# Patient Record
Sex: MALE | Race: WHITE
[De-identification: names, ages, dates, MRNs, and addresses within clinical notes are randomized per-mention and may not be internally consistent; named-entity substitution may affect disease eponyms.]

---

## 2018-09-21 ENCOUNTER — HOSPITAL ENCOUNTER (OUTPATIENT)
Dept: HOSPITAL 19 - SDCO | Age: 48
Discharge: HOME | End: 2018-09-21
Attending: SPECIALIST
Payer: COMMERCIAL

## 2018-09-21 VITALS — DIASTOLIC BLOOD PRESSURE: 80 MMHG | HEART RATE: 73 BPM | SYSTOLIC BLOOD PRESSURE: 113 MMHG

## 2018-09-21 VITALS — DIASTOLIC BLOOD PRESSURE: 89 MMHG | TEMPERATURE: 97 F | HEART RATE: 66 BPM | SYSTOLIC BLOOD PRESSURE: 115 MMHG

## 2018-09-21 VITALS — HEART RATE: 61 BPM | DIASTOLIC BLOOD PRESSURE: 77 MMHG | SYSTOLIC BLOOD PRESSURE: 104 MMHG

## 2018-09-21 VITALS — BODY MASS INDEX: 23.89 KG/M2 | WEIGHT: 157.63 LBS | HEIGHT: 68 IN

## 2018-09-21 VITALS — SYSTOLIC BLOOD PRESSURE: 119 MMHG | TEMPERATURE: 97 F | DIASTOLIC BLOOD PRESSURE: 90 MMHG | HEART RATE: 71 BPM

## 2018-09-21 VITALS — SYSTOLIC BLOOD PRESSURE: 104 MMHG | HEART RATE: 63 BPM | DIASTOLIC BLOOD PRESSURE: 71 MMHG

## 2018-09-21 VITALS — HEART RATE: 62 BPM | SYSTOLIC BLOOD PRESSURE: 102 MMHG | DIASTOLIC BLOOD PRESSURE: 74 MMHG

## 2018-09-21 VITALS — SYSTOLIC BLOOD PRESSURE: 105 MMHG | DIASTOLIC BLOOD PRESSURE: 74 MMHG | HEART RATE: 59 BPM

## 2018-09-21 VITALS — SYSTOLIC BLOOD PRESSURE: 108 MMHG | HEART RATE: 60 BPM | DIASTOLIC BLOOD PRESSURE: 73 MMHG

## 2018-09-21 DIAGNOSIS — Z83.71: ICD-10-CM

## 2018-09-21 DIAGNOSIS — Z12.11: Primary | ICD-10-CM

## 2018-09-21 DIAGNOSIS — D12.8: ICD-10-CM

## 2018-09-21 DIAGNOSIS — Z80.0: ICD-10-CM

## 2018-09-21 DIAGNOSIS — K57.30: ICD-10-CM

## 2018-09-21 DIAGNOSIS — D12.0: ICD-10-CM

## 2022-04-01 ENCOUNTER — HOSPITAL ENCOUNTER (OUTPATIENT)
Dept: HOSPITAL 19 - SDCO | Age: 52
Discharge: HOME | End: 2022-04-01
Attending: SPECIALIST
Payer: COMMERCIAL

## 2022-04-01 VITALS — WEIGHT: 160.72 LBS | BODY MASS INDEX: 24.36 KG/M2 | HEIGHT: 67.99 IN

## 2022-04-01 VITALS — DIASTOLIC BLOOD PRESSURE: 85 MMHG | HEART RATE: 69 BPM | SYSTOLIC BLOOD PRESSURE: 112 MMHG | TEMPERATURE: 97.6 F

## 2022-04-01 VITALS — HEART RATE: 58 BPM | SYSTOLIC BLOOD PRESSURE: 112 MMHG | DIASTOLIC BLOOD PRESSURE: 77 MMHG

## 2022-04-01 VITALS — SYSTOLIC BLOOD PRESSURE: 115 MMHG | HEART RATE: 60 BPM | DIASTOLIC BLOOD PRESSURE: 74 MMHG

## 2022-04-01 VITALS — SYSTOLIC BLOOD PRESSURE: 99 MMHG | TEMPERATURE: 97.3 F | DIASTOLIC BLOOD PRESSURE: 73 MMHG | HEART RATE: 66 BPM

## 2022-04-01 DIAGNOSIS — Z83.71: ICD-10-CM

## 2022-04-01 DIAGNOSIS — Z12.11: Primary | ICD-10-CM

## 2022-04-01 DIAGNOSIS — Z86.010: ICD-10-CM

## 2022-04-01 DIAGNOSIS — K57.30: ICD-10-CM

## 2022-04-01 NOTE — NUR
Vitals obtained. DC instructions and educational material reviewed. Questions
answered by RN. IV discontinued. Catheter tip  intact. Pressure dressing
applied. No redness or swelling noted. Pt denied needing assistance changing
into personal clothes. Call bell remains within reach.

## 2022-04-01 NOTE — NUR
Pt dismissed from endo via wheelchair to pt entrence with his wife by ELIDA Alvarez. Pt has DC packet and personal belongings. Pt was transferred into the care
of his wife, who is present to drive.

## 2022-08-11 NOTE — NUR
Pt arrived from endo suite on cart and was able to ambulate to chair with
minimal difficulty. Vitals obtained. Verbal report obtained from ELIDA Peacock. Pt served wheat toast with butter and jam, and apple juice. Call bell is
within reach at side table. Will continue to monitor per intervals. Dorsal Nasal Flap Text: The defect edges were debeveled with a #15 scalpel blade.  Given the location of the defect and the proximity to free margins a dorsal nasal flap was deemed most appropriate.  Using a sterile surgical marker, an appropriate dorsal nasal flap was drawn around the defect.    The area thus outlined was incised deep to adipose tissue with a #15 scalpel blade.  The skin margins were undermined to an appropriate distance in all directions utilizing iris scissors.